# Patient Record
Sex: FEMALE | Race: WHITE
[De-identification: names, ages, dates, MRNs, and addresses within clinical notes are randomized per-mention and may not be internally consistent; named-entity substitution may affect disease eponyms.]

---

## 2019-03-26 ENCOUNTER — HOSPITAL ENCOUNTER (EMERGENCY)
Dept: HOSPITAL 25 - ED | Age: 19
Discharge: HOME | End: 2019-03-26
Payer: COMMERCIAL

## 2019-03-26 VITALS — DIASTOLIC BLOOD PRESSURE: 64 MMHG | SYSTOLIC BLOOD PRESSURE: 107 MMHG

## 2019-03-26 DIAGNOSIS — R21: ICD-10-CM

## 2019-03-26 DIAGNOSIS — T78.40XA: Primary | ICD-10-CM

## 2019-03-26 DIAGNOSIS — R06.02: ICD-10-CM
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
## 2019-03-26 PROCEDURE — 96374 THER/PROPH/DIAG INJ IV PUSH: CPT

## 2019-03-26 PROCEDURE — 96375 TX/PRO/DX INJ NEW DRUG ADDON: CPT

## 2019-03-26 PROCEDURE — 96361 HYDRATE IV INFUSION ADD-ON: CPT

## 2019-03-26 PROCEDURE — 99283 EMERGENCY DEPT VISIT LOW MDM: CPT

## 2019-03-26 PROCEDURE — 93005 ELECTROCARDIOGRAM TRACING: CPT

## 2019-03-26 NOTE — ED
Allergic Reaction/Systemic





- HPI Summary


HPI Summary: 


18-year-old female presents with allergic reaction today.  She states that she 

ate normal dinner and then also developed a reaction.  She states has urticaria 

goes across her body.  Also felt like her vision was going.  she had shortness 

breath.  She took 4 Benadryl and her inhaler.  States she is feeling better 

now.  She was hypotensive in the ambulance and they just gave her some fluids.  

She has normal vision now.  she just has itchy rash across body.  Denies any 

chest pressures or shortness of breath.  No abdominal pain.  No nausea 

vomiting.  No sore throat.    Denies any other symptoms.  








- History of Current Complaint


Chief Complaint: EDAllergicReaction


Time Seen by Provider: 03/26/19 22:36


Pain Intensity: 0





- Allergies/Home Medications


Allergies/Adverse Reactions: 


 Allergies











Allergy/AdvReac Type Severity Reaction Status Date / Time


 


peanut Allergy  Anaphylatic Verified 03/26/19 22:23





   Shock  


 


shellfish derived Allergy  Vomiting Verified 03/26/19 22:23














PMH/Surg Hx/FS Hx/Imm Hx


Endocrine/Hematology History: 


   Denies: Hx Anticoagulant Therapy


Respiratory History: Reports: Hx Asthma


Infectious Disease History: No


Infectious Disease History: 


   Denies: Traveled Outside the [place] in Last 30 Days





- Family History


Known Family History: Positive: Non-Contributory





- Social History


Alcohol Use: Occasionally


Substance Use Type: Reports: None


Smoking Status (MU): Never Smoked Tobacco





Review of Systems


Negative: Fever


Negative: Chest Pain


Positive: Shortness Of Breath - resolved.  Negative: Cough


Positive: Rash


All Other Systems Reviewed And Are Negative: Yes





Physical Exam


Triage Information Reviewed: Yes


Vital Signs On Initial Exam: 


 Initial Vitals











Temp Pulse Resp BP Pulse Ox


 


 98.4 F   129   20   107/69   100 


 


 03/26/19 22:23  03/26/19 22:23  03/26/19 22:23  03/26/19 22:23  03/26/19 22:23











Vital Signs Reviewed: Yes


Appearance: Positive: Well-Appearing


Skin: Positive: Warm, Dry, Other - redness across body


Head/Face: Positive: Normal Head/Face Inspection


Eyes: Positive: Normal, EOMI, CARRIE, Conjunctiva Clear


ENT: Positive: Normal ENT inspection, Pharynx normal, TMs normal


Respiratory/Lung Sounds: Positive: Clear to Auscultation, Breath Sounds Present


Cardiovascular: Positive: Normal, RRR


Abdomen Description: Positive: Nontender, Soft


Bowel Sounds: Positive: Present


Musculoskeletal: Positive: Normal


Neurological: Positive: Normal


Psychiatric: Positive: Normal





Diagnostics





- Vital Signs


 Vital Signs











  Temp Pulse Resp BP Pulse Ox


 


 03/26/19 23:05   88   108/57  100


 


 03/26/19 23:00   93    100


 


 03/26/19 22:50   110    98


 


 03/26/19 22:23  98.4 F  129  20  107/69  100














- Laboratory


Lab Statement: Any lab studies that have been ordered have been reviewed, and 

results considered in the medical decision making process.





- EKG


  ** No standard instances


Cardiac Rate: NL


EKG Rhythm: Sinus Rhythm


Summary of EKG Findings: sinus rhythm





Re-Evaluation





- Re-Evaluation


  ** First Eval


Re-Evaluation Time: 23:45


Change: Improved


Comment: rash is less, no other symptoms





Allergic Reaction Course/Dx





- Course


Course Of Treatment: 18-year-old female presents with allergic reaction today.  

She states that she ate normal dinner and then also developed a reaction.  She 

states has urticaria goes across her body.  Also felt like her vision was 

going.  she had shortness breath.  She took 4 Benadryl and her inhaler.  States 

she is feeling better now.  She was hypotensive in the ambulance and they just 

gave her some fluids.  She has normal vision now.  she just has itchy rash 

across body.  Denies any chest pressures or shortness of breath.  No abdominal 

pain.  No nausea vomiting.  No sore throat.    Denies any other symptoms.  On 

exam redness noted across body.  Lungs clear to auscultation.  Gave Pepcid and 

solumedrol feeling better and rash improves. told to continue steriod, benadryl 

and pepcid. patient understand and agrees with plan.





- Diagnoses


Differential Diagnosis/HQI/PQRI: Positive: Anaphylaxis, Local Allergic Reaction

, Urticaria


Provider Diagnoses: 


 Allergic reaction








Discharge





- Sign-Out/Discharge


Documenting (check all that apply): Patient Departure


Patient Received Moderate/Deep Sedation with Procedure: No





- Discharge Plan


Condition: Good


Disposition: HOME


Prescriptions: 


Famotidine TAB* [Pepcid 20 MG TAB*] 20 mg PO BID #8 tab


hydrOXYzine HCL TAB* [Atarax 25 MG TAB*] 25 mg PO QID PRN #8 tab


 PRN Reason: Hives


predniSONE TAB* [Deltasone TAB*] 50 mg PO DAILY #4 tab


Patient Education Materials:  General Allergic Reaction (ED)


Referrals: 


[facility] [, APPLICATION, OTHER] - 


Additional Instructions: 


Take Benadryl every 6 hours at night and hydroxyzine during the day every 6 

hours   


Take Pepcid twice a day for 4 days


Take steroid once a day for 4 days starting tomorrow


Return to ED if shortness of breath, chest pain, or if develop any new or 

worsening symptoms








- Billing Disposition and Condition


Condition: GOOD


Disposition: Home

## 2020-01-01 ENCOUNTER — EXTERNAL RECORD (OUTPATIENT)
Dept: OTHER | Age: 20
End: 2020-01-01

## 2020-03-18 ENCOUNTER — HOSPITAL (OUTPATIENT)
Dept: OTHER | Age: 20
End: 2020-03-18

## 2020-03-18 LAB
COLLECTION SOURCE OF SPECIMEN?: NORMAL
SARS-COV-2 RNA SPEC QL NAA+PROBE: NOT DETECTED
SPECIMEN SOURCE: NORMAL

## 2020-03-23 LAB — SARS-COV-2 RNA SPEC QL NAA+PROBE: NOT DETECTED

## 2020-04-14 ENCOUNTER — CLINICAL ABSTRACT (OUTPATIENT)
Dept: HEALTH INFORMATION MANAGEMENT | Age: 20
End: 2020-04-14